# Patient Record
Sex: FEMALE | Race: WHITE | NOT HISPANIC OR LATINO | URBAN - NONMETROPOLITAN AREA
[De-identification: names, ages, dates, MRNs, and addresses within clinical notes are randomized per-mention and may not be internally consistent; named-entity substitution may affect disease eponyms.]

---

## 2019-01-15 ENCOUNTER — IMPORTED ENCOUNTER (OUTPATIENT)
Dept: URBAN - NONMETROPOLITAN AREA CLINIC 1 | Facility: CLINIC | Age: 84
End: 2019-01-15

## 2019-01-15 PROBLEM — H47.399: Noted: 2019-01-15

## 2019-01-15 PROBLEM — H25.22: Noted: 2019-01-15

## 2019-01-15 PROBLEM — H25.012: Noted: 2019-01-15

## 2019-01-15 PROCEDURE — 99213 OFFICE O/P EST LOW 20 MIN: CPT

## 2019-01-15 PROCEDURE — 92015 DETERMINE REFRACTIVE STATE: CPT

## 2019-01-15 NOTE — PATIENT DISCUSSION
Cataract OS -Visually significant.-Cataract(s) causing symptomatic impairment of visual function not correctable with a tolerable change in glasses or contact lenses lighting or non-operative means resulting in specific activity limitations and/or participation restrictions including but not limited to reading viewing television driving or meeting vocational or recreational needs. -Expectation is clearer vision and reduced glare disability after cataract removal.-Pt not ready for surgeryPt to f/u with Dr. Radha Tobias 1 Yr CEE

## 2019-02-08 ENCOUNTER — IMPORTED ENCOUNTER (OUTPATIENT)
Dept: URBAN - NONMETROPOLITAN AREA CLINIC 1 | Facility: CLINIC | Age: 84
End: 2019-02-08

## 2019-02-08 PROBLEM — H47.399: Noted: 2019-01-15

## 2019-02-08 PROBLEM — H25.812: Noted: 2019-02-08

## 2019-02-08 PROBLEM — H35.3132: Noted: 2019-02-08

## 2019-02-08 PROCEDURE — 92014 COMPRE OPH EXAM EST PT 1/>: CPT

## 2019-02-08 NOTE — PATIENT DISCUSSION
Cataract(s)-Visually significant cataract OS. -Cataract(s) causing symptomatic impairment of visual function not correctable with a tolerable change in glasses or contact lenses lighting or non-operative means resulting in specific activity limitations and/or participation restrictions including but not limited to reading viewing television driving or meeting vocational or recreational needs. -Expectation is clearer vision and functional improvement in symptoms as well as reduced glare disability after cataract removal.-Order IOLMaster and OPD today. -Recommend Trad Sx w/ s based on today's OPD testing and lifestyle questionnaire.-All questions were answered regarding surgery including pre and post-op medications appointments activity restrictions and anesthetic usage.-The risks benefits and alternatives and special risk factors for the patient were discussed in detail including but not limited to: bleeding infection retinal detachment vitreous loss problems with the implant and possible need for additional surgery.-Although rare the possibility of complete vision loss was discussed.-The possible need for glasses post-operatively was discussed.-Order H&P.-Patient elects to proceed with cataract surgery OS. -Complex/Trypan -Extra Provisc-Extra ViscoatAMD - dry-Explained dry AMD and advised that there are no treatments available at this time.-Continue AREDS 2 MVT. -Continue Amsler grid monitoring daily. Pt is to contact our office if any changes are noted. -[facility] expectations discussed.

## 2019-03-27 PROBLEM — H35.3132: Noted: 2019-03-27

## 2019-03-27 PROBLEM — H25.812: Noted: 2019-03-27

## 2019-03-27 PROBLEM — H47.399: Noted: 2019-03-27

## 2019-04-05 ENCOUNTER — IMPORTED ENCOUNTER (OUTPATIENT)
Dept: URBAN - NONMETROPOLITAN AREA CLINIC 1 | Facility: CLINIC | Age: 84
End: 2019-04-05

## 2019-04-05 PROBLEM — I10: Noted: 2019-04-05

## 2019-04-05 PROBLEM — E78.5: Noted: 2019-04-05

## 2019-04-05 PROBLEM — H25.812: Noted: 2019-04-05

## 2019-04-05 PROBLEM — Z01.818: Noted: 2019-04-05

## 2019-04-12 ENCOUNTER — IMPORTED ENCOUNTER (OUTPATIENT)
Dept: URBAN - NONMETROPOLITAN AREA CLINIC 1 | Facility: CLINIC | Age: 84
End: 2019-04-12

## 2019-04-12 PROBLEM — I10: Noted: 2019-04-12

## 2019-04-12 PROBLEM — E78.5: Noted: 2019-04-12

## 2019-04-12 PROBLEM — Z01.818: Noted: 2019-04-12

## 2019-04-12 PROBLEM — Z98.42: Noted: 2019-04-12

## 2019-04-12 NOTE — PATIENT DISCUSSION
s/p PCIOL-Pt doing well s/p PCIOL. -Continue post-op gtts according to instruction sheet and sleep with eye shield over eye for 7 nights.-Avoid bending at the waist lifting anything over 5lbs and dirty or siddhartha environments. -RTC 1 week POV. start renata 128 qid os

## 2019-04-19 ENCOUNTER — IMPORTED ENCOUNTER (OUTPATIENT)
Dept: URBAN - NONMETROPOLITAN AREA CLINIC 1 | Facility: CLINIC | Age: 84
End: 2019-04-19

## 2019-05-02 ENCOUNTER — IMPORTED ENCOUNTER (OUTPATIENT)
Dept: URBAN - NONMETROPOLITAN AREA CLINIC 1 | Facility: CLINIC | Age: 84
End: 2019-05-02

## 2019-05-30 ENCOUNTER — IMPORTED ENCOUNTER (OUTPATIENT)
Dept: URBAN - NONMETROPOLITAN AREA CLINIC 1 | Facility: CLINIC | Age: 84
End: 2019-05-30

## 2019-05-30 PROCEDURE — 99024 POSTOP FOLLOW-UP VISIT: CPT

## 2019-05-30 NOTE — PATIENT DISCUSSION
RX CHECKEXPLAINED TO PT THAT NO IMPROVMENT IN VISION DUE TO CNV OSNO CHANGES TODAYs/p PCIOL-Pt doing well at 1 week s/p PCIOL. -Continue post-op gtts according to instruction sheet.-Okay to resume usual activites and d/c eye shield. rx given

## 2019-09-19 ENCOUNTER — IMPORTED ENCOUNTER (OUTPATIENT)
Dept: URBAN - NONMETROPOLITAN AREA CLINIC 1 | Facility: CLINIC | Age: 84
End: 2019-09-19

## 2019-09-19 PROBLEM — H26.493: Noted: 2019-09-19

## 2019-09-19 PROBLEM — Z96.1: Noted: 2019-09-19

## 2019-09-19 PROBLEM — H35.053: Noted: 2019-09-19

## 2019-09-19 PROCEDURE — 99212 OFFICE O/P EST SF 10 MIN: CPT

## 2019-09-19 NOTE — PATIENT DISCUSSION
PC IOL OU w/PCO-Explained symptoms of advancing PCO. -Continue to monitor for now. Pt will notify us if any new symptoms develop. WSB recommends changing left lense to +4.00 add Choroidal neovascularization OU -Discussed in detail w/pt -Order OCT MAC today performed and reviewed w/pt -Continue to monitorRTC 1 yr CEE/PRN

## 2022-04-09 ASSESSMENT — VISUAL ACUITY
OD_PH: 20/80-
OS_CC: 20/100
OD_SC: CF2'
OS_CC: 20/100
OD_CC: CF2'
OS_CC: 20/200
OD_SC: CF2'
OU_CC: J3
OS_SC: 20/100
OS_SC: 20/100-
OS_AM: 20/70
OS_CC: 20/100
OS_GLARE: 20/400

## 2022-04-09 ASSESSMENT — TONOMETRY
OS_IOP_MMHG: 18
OS_IOP_MMHG: 15
OD_IOP_MMHG: 18
OS_IOP_MMHG: 18
OD_IOP_MMHG: 14
OS_IOP_MMHG: 24
OS_IOP_MMHG: 16
OS_IOP_MMHG: 15
OD_IOP_MMHG: 16
OD_IOP_MMHG: 15